# Patient Record
Sex: FEMALE | Race: ASIAN | NOT HISPANIC OR LATINO
[De-identification: names, ages, dates, MRNs, and addresses within clinical notes are randomized per-mention and may not be internally consistent; named-entity substitution may affect disease eponyms.]

---

## 2024-07-16 ENCOUNTER — APPOINTMENT (OUTPATIENT)
Dept: INTERNAL MEDICINE | Facility: CLINIC | Age: 34
End: 2024-07-16

## 2024-07-16 VITALS
DIASTOLIC BLOOD PRESSURE: 85 MMHG | SYSTOLIC BLOOD PRESSURE: 131 MMHG | OXYGEN SATURATION: 99 % | HEIGHT: 62 IN | HEART RATE: 70 BPM | BODY MASS INDEX: 24.18 KG/M2 | TEMPERATURE: 97.8 F | WEIGHT: 131.38 LBS

## 2024-07-16 DIAGNOSIS — Z33.1 PREGNANT STATE, INCIDENTAL: ICD-10-CM

## 2024-07-16 DIAGNOSIS — Z00.00 ENCOUNTER FOR GENERAL ADULT MEDICAL EXAMINATION W/OUT ABNORMAL FINDINGS: ICD-10-CM

## 2024-07-16 DIAGNOSIS — R42 DIZZINESS AND GIDDINESS: ICD-10-CM

## 2024-07-16 LAB
BILIRUB UR QL STRIP: NEGATIVE
CLARITY UR: CLEAR
COLLECTION METHOD: NORMAL
GLUCOSE UR-MCNC: NEGATIVE
HCG UR QL: 0.2 EU/DL
HGB UR QL STRIP.AUTO: NEGATIVE
KETONES UR-MCNC: NEGATIVE
LEUKOCYTE ESTERASE UR QL STRIP: NEGATIVE
NITRITE UR QL STRIP: NEGATIVE
PH UR STRIP: 7
PROT UR STRIP-MCNC: NEGATIVE
SP GR UR STRIP: 1.01

## 2024-07-16 PROCEDURE — 81003 URINALYSIS AUTO W/O SCOPE: CPT | Mod: QW

## 2024-07-16 PROCEDURE — 99385 PREV VISIT NEW AGE 18-39: CPT | Mod: 25,GC

## 2024-07-18 ENCOUNTER — NON-APPOINTMENT (OUTPATIENT)
Age: 34
End: 2024-07-18

## 2024-07-18 LAB
APPEARANCE: CLEAR
BACTERIA UR CULT: NORMAL
BASOPHILS # BLD AUTO: 0.04 K/UL
BASOPHILS NFR BLD AUTO: 0.5 %
BILIRUBIN URINE: NEGATIVE
BLOOD URINE: NEGATIVE
COLOR: YELLOW
CREAT SPEC-SCNC: 43 MG/DL
EOSINOPHIL # BLD AUTO: 0.09 K/UL
EOSINOPHIL NFR BLD AUTO: 1.1 %
GLUCOSE QUALITATIVE U: NEGATIVE MG/DL
HCG SERPL-MCNC: 7153 MIU/ML
HCT VFR BLD CALC: 41 %
HGB BLD-MCNC: 13.1 G/DL
IMM GRANULOCYTES NFR BLD AUTO: 0.2 %
KETONES URINE: NEGATIVE MG/DL
LEUKOCYTE ESTERASE URINE: NEGATIVE
LYMPHOCYTES # BLD AUTO: 2.4 K/UL
LYMPHOCYTES NFR BLD AUTO: 28.5 %
MAN DIFF?: NORMAL
MCHC RBC-ENTMCNC: 28.7 PG
MCHC RBC-ENTMCNC: 32 GM/DL
MCV RBC AUTO: 89.9 FL
MICROALBUMIN 24H UR DL<=1MG/L-MCNC: <1.2 MG/DL
MICROALBUMIN/CREAT 24H UR-RTO: NORMAL MG/G
MONOCYTES # BLD AUTO: 0.5 K/UL
MONOCYTES NFR BLD AUTO: 5.9 %
NEUTROPHILS # BLD AUTO: 5.38 K/UL
NEUTROPHILS NFR BLD AUTO: 63.8 %
NITRITE URINE: NEGATIVE
PH URINE: 6.5
PLATELET # BLD AUTO: 286 K/UL
PROTEIN URINE: NEGATIVE MG/DL
RBC # BLD: 4.56 M/UL
RBC # FLD: 13.7 %
SPECIFIC GRAVITY URINE: 1.01
UROBILINOGEN URINE: 0.2 MG/DL
WBC # FLD AUTO: 8.43 K/UL

## 2024-07-25 ENCOUNTER — APPOINTMENT (OUTPATIENT)
Dept: INTERNAL MEDICINE | Facility: CLINIC | Age: 34
End: 2024-07-25

## 2024-08-12 ENCOUNTER — TRANSCRIPTION ENCOUNTER (OUTPATIENT)
Age: 34
End: 2024-08-12

## 2024-08-12 ENCOUNTER — NON-APPOINTMENT (OUTPATIENT)
Age: 34
End: 2024-08-12

## 2024-08-12 ENCOUNTER — APPOINTMENT (OUTPATIENT)
Dept: OBGYN | Facility: CLINIC | Age: 34
End: 2024-08-12
Payer: COMMERCIAL

## 2024-08-12 VITALS
BODY MASS INDEX: 23.78 KG/M2 | DIASTOLIC BLOOD PRESSURE: 70 MMHG | WEIGHT: 130 LBS | SYSTOLIC BLOOD PRESSURE: 120 MMHG | OXYGEN SATURATION: 98 % | HEART RATE: 87 BPM

## 2024-08-12 DIAGNOSIS — Z34.90 ENCOUNTER FOR SUPERVISION OF NORMAL PREGNANCY, UNSPECIFIED, UNSPECIFIED TRIMESTER: ICD-10-CM

## 2024-08-12 PROCEDURE — 99202 OFFICE O/P NEW SF 15 MIN: CPT | Mod: 25

## 2024-08-13 LAB
ABO + RH PNL BLD: NORMAL
ALBUMIN SERPL ELPH-MCNC: 4.4 G/DL
ALP BLD-CCNC: 45 U/L
ALT SERPL-CCNC: 8 U/L
ANION GAP SERPL CALC-SCNC: 11 MMOL/L
AST SERPL-CCNC: 15 U/L
BILIRUB SERPL-MCNC: 0.3 MG/DL
BLD GP AB SCN SERPL QL: NORMAL
BUN SERPL-MCNC: 9 MG/DL
C TRACH RRNA SPEC QL NAA+PROBE: NOT DETECTED
CALCIUM SERPL-MCNC: 9.4 MG/DL
CHLORIDE SERPL-SCNC: 106 MMOL/L
CO2 SERPL-SCNC: 20 MMOL/L
CREAT SERPL-MCNC: 0.46 MG/DL
EGFR: 130 ML/MIN/1.73M2
GLUCOSE SERPL-MCNC: 84 MG/DL
HBV SURFACE AG SER QL: NONREACTIVE
HCT VFR BLD CALC: 37.6 %
HCV AB SER QL: NONREACTIVE
HCV S/CO RATIO: 0.08 S/CO
HGB A MFR BLD: 97.3 %
HGB A2 MFR BLD: 2.7 %
HGB BLD-MCNC: 12.8 G/DL
HGB FRACT BLD-IMP: NORMAL
HIV1+2 AB SPEC QL IA.RAPID: NONREACTIVE
MCHC RBC-ENTMCNC: 30 PG
MCHC RBC-ENTMCNC: 34 GM/DL
MCV RBC AUTO: 88.1 FL
N GONORRHOEA RRNA SPEC QL NAA+PROBE: NOT DETECTED
PLATELET # BLD AUTO: 306 K/UL
POTASSIUM SERPL-SCNC: 4 MMOL/L
PROT SERPL-MCNC: 6.3 G/DL
RBC # BLD: 4.27 M/UL
RBC # FLD: 13.3 %
SODIUM SERPL-SCNC: 137 MMOL/L
SOURCE AMPLIFICATION: NORMAL
WBC # FLD AUTO: 8.95 K/UL

## 2024-08-15 PROBLEM — Z34.90 ENCOUNTER FOR PRENATAL CARE: Status: ACTIVE | Noted: 2024-08-12

## 2024-08-15 NOTE — PLAN
[FreeTextEntry1] : 35 yo  at 9w4d presenting for initial prenatal visit  - c/w PNV daily  - Trimester 1 labs sent. Pap performed. - CRL consistent w/ VIET by LMP. VIET 3/13/25.  - RTC 4 wk  - Pregnancy folder given, referral for NT and 20w anatomy given.    PGY1 Seen w/ Dr. Thomas attending

## 2024-08-15 NOTE — PHYSICAL EXAM
[Chaperone Present] : A chaperone was present in the examining room during all aspects of the physical examination [Labia Majora] : normal [Labia Minora] : normal [Normal] : normal [Uterine Adnexae] : normal [FreeTextEntry2] : NAD [TextEntry] : , CRL consistent with EGA 9w4d. 3 Cm fibroid appreciated on US.

## 2024-08-15 NOTE — PLAN
[FreeTextEntry1] : 33 yo  at 9w4d presenting for initial prenatal visit  - c/w PNV daily  - Trimester 1 labs sent. Pap performed. - CRL consistent w/ VIET by LMP. VIET 3/13/25.  - RTC 4 wk  - Pregnancy folder given, referral for NT and 20w anatomy given.    PGY1 Seen w/ Dr. Thomas attending

## 2024-08-15 NOTE — HISTORY OF PRESENT ILLNESS
[N] : Patient does not use contraception [Monogamous (Male Partner)] : is monogamous with a male partner [Y] : Positive pregnancy history [Normal Amount/Duration] :  normal amount and duration [Frequency: Q ___ days] : menstrual periods occur approximately every [unfilled] days [Currently Active] : currently active [Men] : men [No] : No [LMPDate] : 6/6/24 [MensesFreq] : 31-55 [MensesLength] : 6 [PGxTotal] : 1 [PGHxFullTerm] : 0 [PGHxPremature] : 0 [PGHxAbortions] : 0 [[facility]] : 0 [PGHxABInduced] : 0 [PGHxABSpont] : 0 [PGHxEctopic] : 0 [PGHxMultBirths] : 0 [FreeTextEntry1] : 6/6/24

## 2024-08-15 NOTE — HISTORY OF PRESENT ILLNESS
[N] : Patient does not use contraception [Monogamous (Male Partner)] : is monogamous with a male partner [Y] : Positive pregnancy history [Normal Amount/Duration] :  normal amount and duration [Frequency: Q ___ days] : menstrual periods occur approximately every [unfilled] days [Currently Active] : currently active [Men] : men [No] : No [LMPDate] : 6/6/24 [MensesFreq] : 31-27 [MensesLength] : 6 [PGxTotal] : 1 [PGHxFullTerm] : 0 [PGHxPremature] : 0 [PGHxAbortions] : 0 [[facility]] : 0 [PGHxABInduced] : 0 [PGHxABSpont] : 0 [PGHxEctopic] : 0 [PGHxMultBirths] : 0 [FreeTextEntry1] : 6/6/24

## 2024-08-15 NOTE — HISTORY OF PRESENT ILLNESS
[N] : Patient does not use contraception [Monogamous (Male Partner)] : is monogamous with a male partner [Y] : Positive pregnancy history [Normal Amount/Duration] :  normal amount and duration [Frequency: Q ___ days] : menstrual periods occur approximately every [unfilled] days [Currently Active] : currently active [Men] : men [No] : No [LMPDate] : 6/6/24 [MensesFreq] : 31-85 [MensesLength] : 6 [PGxTotal] : 1 [PGHxFullTerm] : 0 [PGHxPremature] : 0 [PGHxAbortions] : 0 [[facility]] : 0 [PGHxABInduced] : 0 [PGHxABSpont] : 0 [PGHxEctopic] : 0 [PGHxMultBirths] : 0 [FreeTextEntry1] : 6/6/24

## 2024-08-17 ENCOUNTER — NON-APPOINTMENT (OUTPATIENT)
Age: 34
End: 2024-08-17

## 2024-08-18 LAB
AR GENE MUT ANL BLD/T: NORMAL
B19V IGG SER QL IA: 4.18 INDEX
B19V IGG+IGM SER-IMP: NORMAL
B19V IGG+IGM SER-IMP: POSITIVE
B19V IGM FLD-ACNC: 0.28 INDEX
B19V IGM SER-ACNC: NEGATIVE
BACTERIA UR CULT: NORMAL
CMV IGG SERPL QL: 1.9 U/ML
CMV IGG SERPL-IMP: POSITIVE
CMV IGM SERPL QL: <8 AU/ML
CMV IGM SERPL QL: NEGATIVE
CYTOLOGY CVX/VAG DOC THIN PREP: NORMAL
FMR1 GENE MUT ANL BLD/T: NORMAL
RUBV IGG FLD-ACNC: 4.08 INDEX
RUBV IGG SER-IMP: POSITIVE
T GONDII AB SER-IMP: NEGATIVE
T GONDII AB SER-IMP: NEGATIVE
T GONDII IGG SER QL: <3 IU/ML
T GONDII IGM SER QL: <3 AU/ML
T PALLIDUM AB SER QL IA: NEGATIVE

## 2024-08-19 LAB — CFTR MUT TESTED BLD/T: NEGATIVE

## 2024-08-20 ENCOUNTER — NON-APPOINTMENT (OUTPATIENT)
Age: 34
End: 2024-08-20

## 2024-08-29 ENCOUNTER — APPOINTMENT (OUTPATIENT)
Dept: ANTEPARTUM | Facility: CLINIC | Age: 34
End: 2024-08-29
Payer: COMMERCIAL

## 2024-08-29 ENCOUNTER — ASOB RESULT (OUTPATIENT)
Age: 34
End: 2024-08-29

## 2024-08-29 PROCEDURE — 76813 OB US NUCHAL MEAS 1 GEST: CPT

## 2024-08-29 PROCEDURE — 93976 VASCULAR STUDY: CPT

## 2024-09-04 ENCOUNTER — NON-APPOINTMENT (OUTPATIENT)
Age: 34
End: 2024-09-04

## 2024-09-10 ENCOUNTER — APPOINTMENT (OUTPATIENT)
Dept: OBGYN | Facility: CLINIC | Age: 34
End: 2024-09-10
Payer: COMMERCIAL

## 2024-09-10 VITALS
BODY MASS INDEX: 23.96 KG/M2 | OXYGEN SATURATION: 97 % | DIASTOLIC BLOOD PRESSURE: 76 MMHG | SYSTOLIC BLOOD PRESSURE: 118 MMHG | WEIGHT: 131 LBS | HEART RATE: 79 BPM

## 2024-09-10 PROCEDURE — 99203 OFFICE O/P NEW LOW 30 MIN: CPT | Mod: 25

## 2024-10-07 ENCOUNTER — APPOINTMENT (OUTPATIENT)
Dept: OBGYN | Facility: CLINIC | Age: 34
End: 2024-10-07

## 2024-10-07 VITALS
DIASTOLIC BLOOD PRESSURE: 71 MMHG | SYSTOLIC BLOOD PRESSURE: 113 MMHG | OXYGEN SATURATION: 98 % | BODY MASS INDEX: 24.33 KG/M2 | WEIGHT: 133 LBS | HEART RATE: 70 BPM

## 2024-10-07 DIAGNOSIS — Z23 ENCOUNTER FOR IMMUNIZATION: ICD-10-CM

## 2024-10-07 PROCEDURE — 99213 OFFICE O/P EST LOW 20 MIN: CPT | Mod: 25

## 2024-10-07 PROCEDURE — G0008: CPT

## 2024-10-07 PROCEDURE — 90656 IIV3 VACC NO PRSV 0.5 ML IM: CPT

## 2024-10-08 LAB
AFP INTERP SERPL-IMP: NORMAL
AFP INTERP SERPL-IMP: NORMAL
AFP MOM CUT-OFF: 2.5
AFP MOM SERPL: 1.01
AFP PERCENTILE: 51.3
AFP SERPL-ACNC: 46.23 NG/ML
CARBAMAZEPINE?: NO
CURRENT SMOKER: NORMAL
DIABETES STATUS PATIENT: NORMAL
GA: NORMAL
GESTATIONAL AGE METHOD: NORMAL
HX OF NTD NARR: NORMAL
MULTIPLE PREGNANCY: NORMAL
NEURAL TUBE DEFECT RISK FETUS: NORMAL
NEURAL TUBE DEFECT RISK POP: NORMAL
RECOM F/U: NO
TEST PERFORMANCE INFO SPEC: NORMAL
VALPROIC ACID?: NORMAL

## 2024-10-23 ENCOUNTER — APPOINTMENT (OUTPATIENT)
Dept: ANTEPARTUM | Facility: CLINIC | Age: 34
End: 2024-10-23

## 2024-10-23 ENCOUNTER — ASOB RESULT (OUTPATIENT)
Age: 34
End: 2024-10-23

## 2024-10-23 PROCEDURE — 76811 OB US DETAILED SNGL FETUS: CPT

## 2024-10-23 PROCEDURE — 76817 TRANSVAGINAL US OBSTETRIC: CPT

## 2024-10-30 ENCOUNTER — NON-APPOINTMENT (OUTPATIENT)
Age: 34
End: 2024-10-30

## 2024-11-04 ENCOUNTER — APPOINTMENT (OUTPATIENT)
Dept: OBGYN | Facility: CLINIC | Age: 34
End: 2024-11-04
Payer: COMMERCIAL

## 2024-11-04 VITALS
DIASTOLIC BLOOD PRESSURE: 80 MMHG | SYSTOLIC BLOOD PRESSURE: 110 MMHG | WEIGHT: 136 LBS | BODY MASS INDEX: 24.88 KG/M2 | OXYGEN SATURATION: 98 %

## 2024-11-04 PROCEDURE — 99213 OFFICE O/P EST LOW 20 MIN: CPT | Mod: 25

## 2024-11-05 ENCOUNTER — APPOINTMENT (OUTPATIENT)
Dept: ANTEPARTUM | Facility: CLINIC | Age: 34
End: 2024-11-05

## 2024-11-07 ENCOUNTER — ASOB RESULT (OUTPATIENT)
Age: 34
End: 2024-11-07

## 2024-11-07 ENCOUNTER — APPOINTMENT (OUTPATIENT)
Dept: ANTEPARTUM | Facility: CLINIC | Age: 34
End: 2024-11-07
Payer: COMMERCIAL

## 2024-11-07 PROCEDURE — 76816 OB US FOLLOW-UP PER FETUS: CPT

## 2024-11-27 ENCOUNTER — NON-APPOINTMENT (OUTPATIENT)
Age: 34
End: 2024-11-27

## 2024-12-02 ENCOUNTER — APPOINTMENT (OUTPATIENT)
Dept: OBGYN | Facility: CLINIC | Age: 34
End: 2024-12-02
Payer: COMMERCIAL

## 2024-12-02 VITALS
WEIGHT: 139 LBS | BODY MASS INDEX: 25.42 KG/M2 | HEART RATE: 86 BPM | SYSTOLIC BLOOD PRESSURE: 120 MMHG | DIASTOLIC BLOOD PRESSURE: 86 MMHG | OXYGEN SATURATION: 99 %

## 2024-12-02 DIAGNOSIS — N89.8 OTHER SPECIFIED NONINFLAMMATORY DISORDERS OF VAGINA: ICD-10-CM

## 2024-12-02 PROCEDURE — 99214 OFFICE O/P EST MOD 30 MIN: CPT

## 2024-12-03 LAB
BASOPHILS # BLD AUTO: 0.03 K/UL
BASOPHILS NFR BLD AUTO: 0.4 %
BV BACTERIA RRNA VAG QL NAA+PROBE: NOT DETECTED
C GLABRATA RNA VAG QL NAA+PROBE: NOT DETECTED
CANDIDA RRNA VAG QL PROBE: NOT DETECTED
EOSINOPHIL # BLD AUTO: 0.09 K/UL
EOSINOPHIL NFR BLD AUTO: 1.1 %
GLUCOSE 1H P 50 G GLC PO SERPL-MCNC: 81 MG/DL
HCT VFR BLD CALC: 40.6 %
HGB BLD-MCNC: 13.1 G/DL
IMM GRANULOCYTES NFR BLD AUTO: 0.4 %
LYMPHOCYTES # BLD AUTO: 1.79 K/UL
LYMPHOCYTES NFR BLD AUTO: 22.2 %
MAN DIFF?: NORMAL
MCHC RBC-ENTMCNC: 30.3 PG
MCHC RBC-ENTMCNC: 32.3 G/DL
MCV RBC AUTO: 93.8 FL
MONOCYTES # BLD AUTO: 0.42 K/UL
MONOCYTES NFR BLD AUTO: 5.2 %
NEUTROPHILS # BLD AUTO: 5.71 K/UL
NEUTROPHILS NFR BLD AUTO: 70.7 %
PLATELET # BLD AUTO: 262 K/UL
RBC # BLD: 4.33 M/UL
RBC # FLD: 14.1 %
T PALLIDUM AB SER QL IA: NEGATIVE
T VAGINALIS RRNA SPEC QL NAA+PROBE: NOT DETECTED
WBC # FLD AUTO: 8.07 K/UL

## 2024-12-04 LAB — BACTERIA UR CULT: NORMAL

## 2024-12-18 ENCOUNTER — NON-APPOINTMENT (OUTPATIENT)
Age: 34
End: 2024-12-18

## 2024-12-31 ENCOUNTER — APPOINTMENT (OUTPATIENT)
Dept: OBGYN | Facility: CLINIC | Age: 34
End: 2024-12-31

## 2024-12-31 VITALS
DIASTOLIC BLOOD PRESSURE: 60 MMHG | WEIGHT: 142.42 LBS | OXYGEN SATURATION: 97 % | SYSTOLIC BLOOD PRESSURE: 110 MMHG | BODY MASS INDEX: 26.05 KG/M2

## 2024-12-31 PROCEDURE — 90715 TDAP VACCINE 7 YRS/> IM: CPT

## 2024-12-31 PROCEDURE — 99212 OFFICE O/P EST SF 10 MIN: CPT | Mod: 25

## 2024-12-31 PROCEDURE — 90471 IMMUNIZATION ADMIN: CPT

## 2025-01-14 ENCOUNTER — APPOINTMENT (OUTPATIENT)
Dept: OBGYN | Facility: CLINIC | Age: 35
End: 2025-01-14
Payer: COMMERCIAL

## 2025-01-14 ENCOUNTER — NON-APPOINTMENT (OUTPATIENT)
Age: 35
End: 2025-01-14

## 2025-01-14 VITALS
DIASTOLIC BLOOD PRESSURE: 70 MMHG | OXYGEN SATURATION: 98 % | SYSTOLIC BLOOD PRESSURE: 100 MMHG | BODY MASS INDEX: 25.97 KG/M2 | WEIGHT: 142 LBS

## 2025-01-14 PROCEDURE — 99212 OFFICE O/P EST SF 10 MIN: CPT

## 2025-01-16 ENCOUNTER — APPOINTMENT (OUTPATIENT)
Dept: ANTEPARTUM | Facility: CLINIC | Age: 35
End: 2025-01-16
Payer: COMMERCIAL

## 2025-01-16 ENCOUNTER — ASOB RESULT (OUTPATIENT)
Age: 35
End: 2025-01-16

## 2025-01-16 PROCEDURE — 76819 FETAL BIOPHYS PROFIL W/O NST: CPT | Mod: 59

## 2025-01-16 PROCEDURE — 76816 OB US FOLLOW-UP PER FETUS: CPT

## 2025-01-16 PROCEDURE — 76820 UMBILICAL ARTERY ECHO: CPT | Mod: 59

## 2025-01-28 ENCOUNTER — APPOINTMENT (OUTPATIENT)
Dept: OBGYN | Facility: CLINIC | Age: 35
End: 2025-01-28
Payer: COMMERCIAL

## 2025-01-28 VITALS
DIASTOLIC BLOOD PRESSURE: 67 MMHG | SYSTOLIC BLOOD PRESSURE: 116 MMHG | WEIGHT: 145 LBS | HEART RATE: 80 BPM | OXYGEN SATURATION: 98 % | BODY MASS INDEX: 26.52 KG/M2

## 2025-01-28 PROCEDURE — 99214 OFFICE O/P EST MOD 30 MIN: CPT

## 2025-02-11 ENCOUNTER — APPOINTMENT (OUTPATIENT)
Dept: OBGYN | Facility: CLINIC | Age: 35
End: 2025-02-11

## 2025-02-11 VITALS
DIASTOLIC BLOOD PRESSURE: 70 MMHG | WEIGHT: 147 LBS | BODY MASS INDEX: 26.89 KG/M2 | OXYGEN SATURATION: 98 % | SYSTOLIC BLOOD PRESSURE: 110 MMHG

## 2025-02-11 PROCEDURE — 99213 OFFICE O/P EST LOW 20 MIN: CPT | Mod: 25

## 2025-02-20 ENCOUNTER — APPOINTMENT (OUTPATIENT)
Dept: ANTEPARTUM | Facility: CLINIC | Age: 35
End: 2025-02-20
Payer: COMMERCIAL

## 2025-02-20 ENCOUNTER — APPOINTMENT (OUTPATIENT)
Dept: OBGYN | Facility: CLINIC | Age: 35
End: 2025-02-20

## 2025-02-20 ENCOUNTER — ASOB RESULT (OUTPATIENT)
Age: 35
End: 2025-02-20

## 2025-02-20 PROCEDURE — 76820 UMBILICAL ARTERY ECHO: CPT | Mod: 59

## 2025-02-20 PROCEDURE — 76816 OB US FOLLOW-UP PER FETUS: CPT

## 2025-02-20 PROCEDURE — 99213 OFFICE O/P EST LOW 20 MIN: CPT | Mod: 25

## 2025-02-20 PROCEDURE — 76819 FETAL BIOPHYS PROFIL W/O NST: CPT

## 2025-02-23 LAB
HBV SURFACE AG SER QL: NONREACTIVE
HCT VFR BLD CALC: 40.9 %
HCV RNA SERPL NAA+PROBE-LOG IU: NOT DETECTED LOGIU/ML
HEPC RNA INTERP: NOT DETECTED
HGB BLD-MCNC: 14.1 G/DL
HIV1+2 AB SPEC QL IA.RAPID: NONREACTIVE
MCHC RBC-ENTMCNC: 30.4 PG
MCHC RBC-ENTMCNC: 34.5 G/DL
MCV RBC AUTO: 88.1 FL
PLATELET # BLD AUTO: 222 K/UL
RBC # BLD: 4.64 M/UL
RBC # FLD: 13.5 %
T PALLIDUM AB SER QL IA: NEGATIVE
WBC # FLD AUTO: 9.92 K/UL

## 2025-02-24 ENCOUNTER — NON-APPOINTMENT (OUTPATIENT)
Age: 35
End: 2025-02-24

## 2025-02-24 ENCOUNTER — APPOINTMENT (OUTPATIENT)
Dept: OBGYN | Facility: CLINIC | Age: 35
End: 2025-02-24
Payer: COMMERCIAL

## 2025-02-24 VITALS
OXYGEN SATURATION: 98 % | DIASTOLIC BLOOD PRESSURE: 78 MMHG | WEIGHT: 149 LBS | SYSTOLIC BLOOD PRESSURE: 126 MMHG | HEART RATE: 73 BPM | BODY MASS INDEX: 27.25 KG/M2

## 2025-02-24 LAB — B-HEM STREP SPEC QL CULT: NORMAL

## 2025-02-24 PROCEDURE — 99213 OFFICE O/P EST LOW 20 MIN: CPT

## 2025-03-06 ENCOUNTER — APPOINTMENT (OUTPATIENT)
Dept: OBGYN | Facility: CLINIC | Age: 35
End: 2025-03-06
Payer: COMMERCIAL

## 2025-03-06 VITALS
OXYGEN SATURATION: 98 % | WEIGHT: 151 LBS | DIASTOLIC BLOOD PRESSURE: 80 MMHG | BODY MASS INDEX: 27.62 KG/M2 | SYSTOLIC BLOOD PRESSURE: 120 MMHG

## 2025-03-06 PROCEDURE — 99213 OFFICE O/P EST LOW 20 MIN: CPT

## 2025-03-10 ENCOUNTER — NON-APPOINTMENT (OUTPATIENT)
Age: 35
End: 2025-03-10

## 2025-03-10 ENCOUNTER — APPOINTMENT (OUTPATIENT)
Dept: OBGYN | Facility: CLINIC | Age: 35
End: 2025-03-10
Payer: COMMERCIAL

## 2025-03-10 VITALS
SYSTOLIC BLOOD PRESSURE: 120 MMHG | OXYGEN SATURATION: 97 % | BODY MASS INDEX: 27.98 KG/M2 | DIASTOLIC BLOOD PRESSURE: 80 MMHG | WEIGHT: 153 LBS

## 2025-03-10 PROCEDURE — 99214 OFFICE O/P EST MOD 30 MIN: CPT

## 2025-03-17 ENCOUNTER — ASOB RESULT (OUTPATIENT)
Age: 35
End: 2025-03-17

## 2025-03-17 ENCOUNTER — APPOINTMENT (OUTPATIENT)
Dept: ANTEPARTUM | Facility: CLINIC | Age: 35
End: 2025-03-17
Payer: COMMERCIAL

## 2025-03-17 ENCOUNTER — APPOINTMENT (OUTPATIENT)
Dept: OBGYN | Facility: CLINIC | Age: 35
End: 2025-03-17
Payer: COMMERCIAL

## 2025-03-17 VITALS
DIASTOLIC BLOOD PRESSURE: 80 MMHG | SYSTOLIC BLOOD PRESSURE: 110 MMHG | BODY MASS INDEX: 27.98 KG/M2 | OXYGEN SATURATION: 97 % | WEIGHT: 153 LBS

## 2025-03-17 PROCEDURE — 76816 OB US FOLLOW-UP PER FETUS: CPT

## 2025-03-17 PROCEDURE — 76818 FETAL BIOPHYS PROFILE W/NST: CPT | Mod: 59

## 2025-03-17 PROCEDURE — 76820 UMBILICAL ARTERY ECHO: CPT | Mod: 59

## 2025-03-17 PROCEDURE — 99213 OFFICE O/P EST LOW 20 MIN: CPT

## 2025-03-20 ENCOUNTER — INPATIENT (INPATIENT)
Facility: HOSPITAL | Age: 35
LOS: 3 days | Discharge: HOME | End: 2025-03-24
Attending: OBSTETRICS & GYNECOLOGY | Admitting: OBSTETRICS & GYNECOLOGY
Payer: COMMERCIAL

## 2025-03-21 VITALS
SYSTOLIC BLOOD PRESSURE: 105 MMHG | TEMPERATURE: 98 F | DIASTOLIC BLOOD PRESSURE: 66 MMHG | WEIGHT: 293 LBS | RESPIRATION RATE: 18 BRPM | HEIGHT: 62 IN | HEART RATE: 65 BPM

## 2025-03-21 LAB
BASOPHILS # BLD AUTO: 0.04 K/UL — SIGNIFICANT CHANGE UP (ref 0–0.2)
BASOPHILS NFR BLD AUTO: 0.4 % — SIGNIFICANT CHANGE UP (ref 0–2)
BLD GP AB SCN SERPL QL: NEGATIVE — SIGNIFICANT CHANGE UP
EOSINOPHIL # BLD AUTO: 0.05 K/UL — SIGNIFICANT CHANGE UP (ref 0–0.5)
EOSINOPHIL NFR BLD AUTO: 0.4 % — SIGNIFICANT CHANGE UP (ref 0–6)
HCT VFR BLD CALC: 38.5 % — SIGNIFICANT CHANGE UP (ref 34.5–45)
HGB BLD-MCNC: 13.3 G/DL — SIGNIFICANT CHANGE UP (ref 11.5–15.5)
IMM GRANULOCYTES NFR BLD AUTO: 0.7 % — SIGNIFICANT CHANGE UP (ref 0–0.9)
LYMPHOCYTES # BLD AUTO: 2.95 K/UL — SIGNIFICANT CHANGE UP (ref 1–3.3)
LYMPHOCYTES # BLD AUTO: 26.1 % — SIGNIFICANT CHANGE UP (ref 13–44)
MCHC RBC-ENTMCNC: 30.8 PG — SIGNIFICANT CHANGE UP (ref 27–34)
MCHC RBC-ENTMCNC: 34.5 G/DL — SIGNIFICANT CHANGE UP (ref 32–36)
MCV RBC AUTO: 89.1 FL — SIGNIFICANT CHANGE UP (ref 80–100)
MONOCYTES # BLD AUTO: 0.66 K/UL — SIGNIFICANT CHANGE UP (ref 0–0.9)
MONOCYTES NFR BLD AUTO: 5.8 % — SIGNIFICANT CHANGE UP (ref 2–14)
NEUTROPHILS # BLD AUTO: 7.51 K/UL — HIGH (ref 1.8–7.4)
NEUTROPHILS NFR BLD AUTO: 66.6 % — SIGNIFICANT CHANGE UP (ref 43–77)
NRBC BLD AUTO-RTO: 0 /100 WBCS — SIGNIFICANT CHANGE UP (ref 0–0)
PLATELET # BLD AUTO: 252 K/UL — SIGNIFICANT CHANGE UP (ref 150–400)
RBC # BLD: 4.32 M/UL — SIGNIFICANT CHANGE UP (ref 3.8–5.2)
RBC # FLD: 12.7 % — SIGNIFICANT CHANGE UP (ref 10.3–14.5)
RH IG SCN BLD-IMP: POSITIVE — SIGNIFICANT CHANGE UP
RH IG SCN BLD-IMP: POSITIVE — SIGNIFICANT CHANGE UP
T PALLIDUM AB TITR SER: NEGATIVE — SIGNIFICANT CHANGE UP
WBC # BLD: 11.29 K/UL — HIGH (ref 3.8–10.5)
WBC # FLD AUTO: 11.29 K/UL — HIGH (ref 3.8–10.5)

## 2025-03-21 PROCEDURE — 88307 TISSUE EXAM BY PATHOLOGIST: CPT | Mod: 26

## 2025-03-21 PROCEDURE — 74018 RADEX ABDOMEN 1 VIEW: CPT | Mod: 26

## 2025-03-21 PROCEDURE — 59514 CESAREAN DELIVERY ONLY: CPT | Mod: U9,GC

## 2025-03-21 RX ORDER — MAGNESIUM HYDROXIDE 400 MG/5ML
30 SUSPENSION ORAL
Refills: 0 | Status: DISCONTINUED | OUTPATIENT
Start: 2025-03-21 | End: 2025-03-24

## 2025-03-21 RX ORDER — ONDANSETRON HCL/PF 4 MG/2 ML
4 VIAL (ML) INJECTION EVERY 6 HOURS
Refills: 0 | Status: DISCONTINUED | OUTPATIENT
Start: 2025-03-21 | End: 2025-03-24

## 2025-03-21 RX ORDER — CEFAZOLIN SODIUM IN 0.9 % NACL 3 G/100 ML
2000 INTRAVENOUS SOLUTION, PIGGYBACK (ML) INTRAVENOUS EVERY 8 HOURS
Refills: 0 | Status: COMPLETED | OUTPATIENT
Start: 2025-03-21 | End: 2025-03-22

## 2025-03-21 RX ORDER — OXYCODONE HYDROCHLORIDE 30 MG/1
5 TABLET ORAL
Refills: 0 | Status: DISCONTINUED | OUTPATIENT
Start: 2025-03-21 | End: 2025-03-24

## 2025-03-21 RX ORDER — SIMETHICONE 80 MG
80 TABLET,CHEWABLE ORAL EVERY 4 HOURS
Refills: 0 | Status: DISCONTINUED | OUTPATIENT
Start: 2025-03-21 | End: 2025-03-24

## 2025-03-21 RX ORDER — ACETAMINOPHEN 500 MG/5ML
975 LIQUID (ML) ORAL
Refills: 0 | Status: DISCONTINUED | OUTPATIENT
Start: 2025-03-21 | End: 2025-03-24

## 2025-03-21 RX ORDER — OXYCODONE HYDROCHLORIDE 30 MG/1
5 TABLET ORAL ONCE
Refills: 0 | Status: DISCONTINUED | OUTPATIENT
Start: 2025-03-21 | End: 2025-03-24

## 2025-03-21 RX ORDER — DIPHENHYDRAMINE HCL 12.5MG/5ML
25 ELIXIR ORAL EVERY 6 HOURS
Refills: 0 | Status: DISCONTINUED | OUTPATIENT
Start: 2025-03-21 | End: 2025-03-24

## 2025-03-21 RX ORDER — HYDROMORPHONE/SOD CHLOR,ISO/PF 2 MG/10 ML
30 SYRINGE (ML) INJECTION
Refills: 0 | Status: DISCONTINUED | OUTPATIENT
Start: 2025-03-21 | End: 2025-03-22

## 2025-03-21 RX ORDER — ACETAMINOPHEN 500 MG/5ML
1000 LIQUID (ML) ORAL ONCE
Refills: 0 | Status: COMPLETED | OUTPATIENT
Start: 2025-03-21 | End: 2025-03-21

## 2025-03-21 RX ORDER — OXYTOCIN-SODIUM CHLORIDE 0.9% IV SOLN 30 UNIT/500ML 30-0.9/5 UT/ML-%
167 SOLUTION INTRAVENOUS
Qty: 30 | Refills: 0 | Status: DISCONTINUED | OUTPATIENT
Start: 2025-03-21 | End: 2025-03-24

## 2025-03-21 RX ORDER — SODIUM CHLORIDE 9 G/1000ML
1000 INJECTION, SOLUTION INTRAVENOUS
Refills: 0 | Status: DISCONTINUED | OUTPATIENT
Start: 2025-03-21 | End: 2025-03-24

## 2025-03-21 RX ORDER — OXYTOCIN-SODIUM CHLORIDE 0.9% IV SOLN 30 UNIT/500ML 30-0.9/5 UT/ML-%
SOLUTION INTRAVENOUS
Qty: 30 | Refills: 0 | Status: DISCONTINUED | OUTPATIENT
Start: 2025-03-21 | End: 2025-03-21

## 2025-03-21 RX ORDER — IBUPROFEN 200 MG
600 TABLET ORAL EVERY 6 HOURS
Refills: 0 | Status: COMPLETED | OUTPATIENT
Start: 2025-03-21 | End: 2026-02-17

## 2025-03-21 RX ORDER — CITRIC ACID/SODIUM CITRATE 300-500 MG
15 SOLUTION, ORAL ORAL EVERY 6 HOURS
Refills: 0 | Status: DISCONTINUED | OUTPATIENT
Start: 2025-03-21 | End: 2025-03-21

## 2025-03-21 RX ORDER — OXYTOCIN-SODIUM CHLORIDE 0.9% IV SOLN 30 UNIT/500ML 30-0.9/5 UT/ML-%
42 SOLUTION INTRAVENOUS
Qty: 30 | Refills: 0 | Status: DISCONTINUED | OUTPATIENT
Start: 2025-03-21 | End: 2025-03-24

## 2025-03-21 RX ORDER — SODIUM CHLORIDE 9 G/1000ML
1000 INJECTION, SOLUTION INTRAVENOUS
Refills: 0 | Status: DISCONTINUED | OUTPATIENT
Start: 2025-03-21 | End: 2025-03-21

## 2025-03-21 RX ORDER — ENOXAPARIN SODIUM 100 MG/ML
40 INJECTION SUBCUTANEOUS EVERY 24 HOURS
Refills: 0 | Status: DISCONTINUED | OUTPATIENT
Start: 2025-03-22 | End: 2025-03-24

## 2025-03-21 RX ORDER — CLOSTRIDIUM TETANI TOXOID ANTIGEN (FORMALDEHYDE INACTIVATED), CORYNEBACTERIUM DIPHTHERIAE TOXOID ANTIGEN (FORMALDEHYDE INACTIVATED), BORDETELLA PERTUSSIS TOXOID ANTIGEN (GLUTARALDEHYDE INACTIVATED), BORDETELLA PERTUSSIS FILAMENTOUS HEMAGGLUTININ ANTIGEN (FORMALDEHYDE INACTIVATED), BORDETELLA PERTUSSIS PERTACTIN ANTIGEN, AND BORDETELLA PERTUSSIS FIMBRIAE 2/3 ANTIGEN 5; 2; 2.5; 5; 3; 5 [LF]/.5ML; [LF]/.5ML; UG/.5ML; UG/.5ML; UG/.5ML; UG/.5ML
0.5 INJECTION, SUSPENSION INTRAMUSCULAR ONCE
Refills: 0 | Status: DISCONTINUED | OUTPATIENT
Start: 2025-03-21 | End: 2025-03-24

## 2025-03-21 RX ORDER — MODIFIED LANOLIN 100 %
1 CREAM (GRAM) TOPICAL EVERY 6 HOURS
Refills: 0 | Status: DISCONTINUED | OUTPATIENT
Start: 2025-03-21 | End: 2025-03-24

## 2025-03-21 RX ORDER — KETOROLAC TROMETHAMINE 30 MG/ML
30 INJECTION, SOLUTION INTRAMUSCULAR; INTRAVENOUS EVERY 6 HOURS
Refills: 0 | Status: DISCONTINUED | OUTPATIENT
Start: 2025-03-21 | End: 2025-03-22

## 2025-03-21 RX ORDER — NALOXONE HYDROCHLORIDE 0.4 MG/ML
0.1 INJECTION, SOLUTION INTRAMUSCULAR; INTRAVENOUS; SUBCUTANEOUS
Refills: 0 | Status: DISCONTINUED | OUTPATIENT
Start: 2025-03-21 | End: 2025-03-24

## 2025-03-21 RX ADMIN — KETOROLAC TROMETHAMINE 30 MILLIGRAM(S): 30 INJECTION, SOLUTION INTRAMUSCULAR; INTRAVENOUS at 18:38

## 2025-03-21 RX ADMIN — Medication 30 MILLILITER(S): at 13:00

## 2025-03-21 RX ADMIN — SODIUM CHLORIDE 125 MILLILITER(S): 9 INJECTION, SOLUTION INTRAVENOUS at 12:38

## 2025-03-21 RX ADMIN — SODIUM CHLORIDE 125 MILLILITER(S): 9 INJECTION, SOLUTION INTRAVENOUS at 08:15

## 2025-03-21 RX ADMIN — Medication 100 MILLIGRAM(S): at 19:15

## 2025-03-21 RX ADMIN — KETOROLAC TROMETHAMINE 30 MILLIGRAM(S): 30 INJECTION, SOLUTION INTRAMUSCULAR; INTRAVENOUS at 23:52

## 2025-03-21 RX ADMIN — OXYTOCIN-SODIUM CHLORIDE 0.9% IV SOLN 30 UNIT/500ML 42 MILLIUNIT(S)/MIN: 30-0.9/5 SOLUTION at 12:38

## 2025-03-21 NOTE — OB RN INTRAOPERATIVE NOTE - NSSELHIDDEN_OBGYN_ALL_OB_FT
[NS_DeliveryAttending1_OBGYN_ALL_OB_FT:[id number]==],[NS_DeliveryAssist1_OBGYN_ALL_OB_FT:[id number]=],[NS_DeliveryRN_OBGYN_ALL_OB_FT:[id number]=]

## 2025-03-21 NOTE — OB PROVIDER H&P - HISTORY OF PRESENT ILLNESS
33 y/o  at 41w1d presenting for induction of labor for post-dates. She denies loss of fluid, contractions, and vaginal bleeding. She endorses normal fetal movement.    Ante: Spontaneous pregnancy. NIPT and anatomy scan wnl. Passed GCT. Denies elevated blood pressures in this pregnancy.  EFW 3000 g by [place] 3/17: Cephalic. Posterior placenta. ARMAAN 15.67. EFW 3539 35%, AC 21%.  GBS negative    OBHX:  G1 - Current  GynHX: denies fibroids, ovarian cysts, abnormal pap smear, STI/herpes.   MedHX: denies  Surghx: denies  Medications: denies  Allergies: NKDA    Physical Exam:  T(C): 36.8 (25 @ 01:14), Max: 36.8 (25 @ 01:14)  HR: 65 (25 @ 01:14) (65 - 65)  BP: 105/66 (25 @ 01:14) (105/66 - 105/66)  RR: 18 (25 @ 01:14) (18 - 18)  SpO2: --    General: in no acute distress  Pulm: no increased WOB  Abdomen: soft, gravid, nontender  Extremities: wnl     TAUS: Cephalic  VE: 1-2/50/-3    EFM: 115 bpm, mod variability, + accels, - decels; reactive and reassuring  [place]: no ctx seen on toco    Assessment and Plan  33 y/o  at 41w1d presenting for induction of labor for post-dates.  - Admit to L&D  - Consent signed for balloon, pitocin, and vaginal delivery; all risks/benefits/alternatives were discussed and patient agreed to above plan  - NPO  - IV fluids and labs ordered  - GBS negative  - Continuous EFM     Discussed with Dr. Caldreon and attending Dr. Alexx Richardson PGY1

## 2025-03-21 NOTE — OB PROVIDER LABOR PROGRESS NOTE - NS_SUBJECTIVE/OBJECTIVE_OBGYN_ALL_OB_FT
Balloon placed at 0400 80 to tension; VE 1-2/50/-3.  EFM reviewed.  Baseline rate is 125 bpm, mod ankit, + accels, - decels  No ctx seen on toco  Cat I tracing  Plan to start pitocin shortly
Day team assuming care at this time. EFM and labor plan reviewed. Last VE: 2/50/-3.

## 2025-03-21 NOTE — PRE-ANESTHESIA EVALUATION ADULT - WEIGHT IN LBS
SELINA called the [facility] to follow up on patient's referral.  SELINA was informed that it was assigned to a worker, Sugar ([phone]), and that she made contact with patient on 8/24.    SELINA attempted to contact Sugar and left a message requesting a return call.   125

## 2025-03-21 NOTE — OB PROVIDER DELIVERY SUMMARY - NSCSPRIMARYINDICATIONA_OBGYN_ALL_OB
3/20/2017 2:50 PM 
 
Patient:  Contreras Cast YOB: 1948 Date of Visit: 3/20/2017 Dear Sivan Guy, NP 
[address] [place] [id number] [place] [id number] VIA In Basket 
 : Thank you for referring Ms. Papi James to me for evaluation/treatment. Below are the relevant portions of my assessment and plan of care. If you have questions, please do not hesitate to call me. I look forward to following Ms. Felix Maldonado along with you. Sincerely, Jay Archibald MD 
 

Fetal Heart Rate Abnormality

## 2025-03-21 NOTE — OB PROVIDER DELIVERY SUMMARY - NSSELHIDDEN_OBGYN_ALL_OB_FT
[NS_DeliveryAttending1_OBGYN_ALL_OB_FT:[id number]==],[NS_DeliveryAssist1_OBGYN_ALL_OB_FT:[id number]=],[NS_DeliveryRN_OBGYN_ALL_OB_FT:[id number]=]

## 2025-03-21 NOTE — OB PROVIDER LABOR PROGRESS NOTE - ASSESSMENT
Resident arrived in patient room due to 4 minutes of inability to trace baby. Patient unmedicated, in a lot of pain, and standing. Examined due to rectal pressure and found to be 8cm. Attempted repositioning in left lateral, right lateral, and then all fours position. There was still no ability to adequately trace baby and intermittently FH could be heard in the 60-80s. FSE placed to more adequately monitor FH. FH confirmed to be 60s-80s. At time of FSE placement, patient fully dilated and attempted pushing with good fetal movement. At this point FH was down or unable to be traced for a total of 19 minutes. OB/ STAT called. Patient brought to operating room where FH was still 60s-80s. Attempted again to push but fetal head still too high to vacuum.  Decision made to proceed with STAT  section. See delivery summary for details of  section. 
- cook balloon in situ  - titrate pitocin as tolerated  - will continue to monitor

## 2025-03-21 NOTE — OB RN PATIENT PROFILE - FALL HARM RISK - HARM RISK INTERVENTIONS

## 2025-03-21 NOTE — OB PROVIDER H&P - NSLOWPPHRISK_OBGYN_A_OB
No previous uterine incision/Paige Pregnancy/Less than or equal to 4 previous vaginal births/No known bleeding disorder/No history of postpartum hemorrhage/No other PPH risks indicated

## 2025-03-21 NOTE — OB PROVIDER DELIVERY SUMMARY - NSPROVIDERDELIVERYNOTE_OBGYN_ALL_OB_FT
Patient 35yo  at 41+1 presented for IOL secondary to post-dates. Patient made rapid cervical change and OB STAT called for terminal bradycardia. In OR, proceeded with stat c/section as FHR noted to be in 60-70s. Patient underwent general anesthesia. Stat low transverse  section performed. Delivery of live infant in cephalic presentation without difficulty. Right uterine extension noted. Uterus closed in two layers with 0 Vicryl suture. Excellent hemostasis. Fascia closed with 1-0 Vicryl suture. Skin closed with ensorb. EBL 800cc, IVF 1500cc, UOP 50cc. Patient tolerated procedure well.

## 2025-03-21 NOTE — PRE-ANESTHESIA EVALUATION ADULT - NSANTHOSAYNRD_GEN_A_CORE
No. SHAKIR screening performed.  STOP BANG Legend: 0-2 = LOW Risk; 3-4 = INTERMEDIATE Risk; 5-8 = HIGH Risk

## 2025-03-21 NOTE — OB RN DELIVERY SUMMARY - NSSELHIDDEN_OBGYN_ALL_OB_FT
[NS_DeliveryAttending1_OBGYN_ALL_OB_FT:[id number]==],[NS_DeliveryAssist1_OBGYN_ALL_OB_FT:[id number]],[NS_DeliveryRN_OBGYN_ALL_OB_FT:[id number]=]

## 2025-03-21 NOTE — PRE-ANESTHESIA EVALUATION ADULT - NSPROPOSEDPROCEDFT_GEN_ALL_CORE
OBSTETRIC HISTORY:   OB History    No obstetric history on file.          COMPREHENSIVE GYN HISTORY:  PAP History: Denies abnormal Paps.  Infection History: Denies STDs. Denies PID.  Benign History: Denies uterine fibroids. Denies ovarian cysts. Denies endometriosis.   Cancer History: Denies cervical cancer. Denies uterine cancer or hyperplasia. Denies ovarian cancer. Denies vulvar cancer or pre-cancer. Denies vaginal cancer or pre-cancer. Denies breast cancer. Denies colon cancer.  Sexual Activity History:   reports never being sexually active.   Menstrual History: Age of menarche: 10 years. Every 3 months (previously 1 year but has been working out 5 days a week--discussed might weigh the same but leaner which decreases fat which decreases peripheral conversion of estrogen). Heavy flow.  Dysmenorrhea History: Reports moderate dysmenorrhea.  Contraception: None not active    HPI:   19 y.o.  Patient's last menstrual period was 06/05/2024.   Patient is  here for her annual gynecologic exam and discuss PCOS. Feels like she is not getting answers from "[employer]. Took Provera in the past and OCP but vomited both pills. Only one family member with diabetes and no obesity to her knowledge (sister had issue but had eating disorder). She denies bladder, breast and bowel complaints.    ROS:  GENERAL: No weight gain or weight loss.   CHEST: No shortness of breath.   ABDOMEN: No abdominal pain, constipation, diarrhea, nausea, vomiting or rectal bleeding.   URINARY: No frequency, dysuria, hematuria, or burning on urination.  REPRODUCTIVE: See HPI.   BREASTS: No breast pain, lumps, or nipple discharge.   PSYCHIATRIC: No depression or anxiety.    PE:   /71   Wt 77.6 kg (170 lb 15.5 oz)   LMP 06/05/2024   BMI 28.90 kg/m²   APPEARANCE: Well nourished, well developed, in no acute distress.  NECK: Neck symmetric without  thyromegaly.  NODES: No inguinal, cervical lymph node enlargement.  CHEST: Lungs clear to  auscultation.  HEART: Regular rate and rhythm, no murmurs, rubs or gallops.  ABDOMEN: Soft. No tenderness or masses. No hernias.  BREASTS: Symmetrical, no skin changes or visible lesions. No palpable masses, nipple discharge or adenopathy bilaterally.  PELVIC: DEFER    PROCEDURES:  Pap smear--DEFER AGE 21      Assessment:  Normal Gynecologic Exam  Oligomenorrhea--PCOS--labs today fasting (had one sip of water)--will try Prometrium to see if tolerates--discussed importance of periods and shedding lining    Plan:  Mammogram and Colonoscopy if indicated by current recommendations.  Return to clinic in one year or for any problems or complaints.    Counseling:  Patient was counseled today on A.C.S. Pap guidelines and recommendations for yearly pelvic exams and monthly self breast exams; to see her PCP for other health maintenance.     As of April 1, 2021, the Cures Act has been passed nationally. This new law requires that all doctors progress notes, lab results, pathology reports and radiology reports be released IMMEDIATELY to the patient in the patient portal. That means that the results are released to you at the EXACT same time they are released to me. Therefore, with all of the patients that I have I am not able to reply to each patient exactly when the results come in. So there will be a delay from when you see the results to when I see them and have time to come up with a response to send you. Also I only see these results when I am on the computer at work. So if the results come in over the weekend or after 5 pm of a work day, I will not see them until the next business day. As you can tell, this is a challenge as a physician to give every patient the quick response they hope for and deserve. So please be patient!     Thanks for understanding,                    Emergent  section

## 2025-03-21 NOTE — OB NEONATOLOGY/PEDIATRICIAN DELIVERY SUMMARY - NSPEDSNEONOTESA_OBGYN_ALL_OB_FT
Rapid response called for fetal bradycardia on 41 week infant. Meconium noted at time of delivery. Infant emerged with tone and activity. Brought to radiant warmer and routine resuscitation performed. Will follow up cord gases.

## 2025-03-21 NOTE — OB RN PATIENT PROFILE - SURGICAL SITE INCISION
Her hip gives out at times, she is thinking she should possibly go for an xray? Pls return her call, thanks.    no

## 2025-03-21 NOTE — OB RN DELIVERY SUMMARY - BABY A: APGAR 5 MIN MUSCLE TONE, DELIVERY
Called @ 8:06 AM 02/13/23 and [facility] for patient to help with scheduling.  LAZARUS Voss MA       (2) well flexed

## 2025-03-22 LAB
BASOPHILS # BLD AUTO: 0.03 K/UL — SIGNIFICANT CHANGE UP (ref 0–0.2)
BASOPHILS NFR BLD AUTO: 0.2 % — SIGNIFICANT CHANGE UP (ref 0–2)
EOSINOPHIL # BLD AUTO: 0.01 K/UL — SIGNIFICANT CHANGE UP (ref 0–0.5)
EOSINOPHIL NFR BLD AUTO: 0.1 % — SIGNIFICANT CHANGE UP (ref 0–6)
HCT VFR BLD CALC: 32.2 % — LOW (ref 34.5–45)
HGB BLD-MCNC: 11.2 G/DL — LOW (ref 11.5–15.5)
IMM GRANULOCYTES NFR BLD AUTO: 0.4 % — SIGNIFICANT CHANGE UP (ref 0–0.9)
LYMPHOCYTES # BLD AUTO: 15.6 % — SIGNIFICANT CHANGE UP (ref 13–44)
LYMPHOCYTES # BLD AUTO: 2.07 K/UL — SIGNIFICANT CHANGE UP (ref 1–3.3)
MCHC RBC-ENTMCNC: 31.2 PG — SIGNIFICANT CHANGE UP (ref 27–34)
MCHC RBC-ENTMCNC: 34.8 G/DL — SIGNIFICANT CHANGE UP (ref 32–36)
MCV RBC AUTO: 89.7 FL — SIGNIFICANT CHANGE UP (ref 80–100)
MONOCYTES # BLD AUTO: 0.93 K/UL — HIGH (ref 0–0.9)
MONOCYTES NFR BLD AUTO: 7 % — SIGNIFICANT CHANGE UP (ref 2–14)
NEUTROPHILS # BLD AUTO: 10.17 K/UL — HIGH (ref 1.8–7.4)
NEUTROPHILS NFR BLD AUTO: 76.7 % — SIGNIFICANT CHANGE UP (ref 43–77)
NRBC BLD AUTO-RTO: 0 /100 WBCS — SIGNIFICANT CHANGE UP (ref 0–0)
PLATELET # BLD AUTO: 190 K/UL — SIGNIFICANT CHANGE UP (ref 150–400)
RBC # BLD: 3.59 M/UL — LOW (ref 3.8–5.2)
RBC # FLD: 12.9 % — SIGNIFICANT CHANGE UP (ref 10.3–14.5)
WBC # BLD: 13.26 K/UL — HIGH (ref 3.8–10.5)
WBC # FLD AUTO: 13.26 K/UL — HIGH (ref 3.8–10.5)

## 2025-03-22 RX ORDER — IBUPROFEN 200 MG
600 TABLET ORAL EVERY 6 HOURS
Refills: 0 | Status: DISCONTINUED | OUTPATIENT
Start: 2025-03-22 | End: 2025-03-24

## 2025-03-22 RX ADMIN — Medication 975 MILLIGRAM(S): at 15:22

## 2025-03-22 RX ADMIN — Medication 100 MILLIGRAM(S): at 12:54

## 2025-03-22 RX ADMIN — Medication 600 MILLIGRAM(S): at 18:46

## 2025-03-22 RX ADMIN — KETOROLAC TROMETHAMINE 30 MILLIGRAM(S): 30 INJECTION, SOLUTION INTRAMUSCULAR; INTRAVENOUS at 12:54

## 2025-03-22 RX ADMIN — KETOROLAC TROMETHAMINE 30 MILLIGRAM(S): 30 INJECTION, SOLUTION INTRAMUSCULAR; INTRAVENOUS at 05:25

## 2025-03-22 RX ADMIN — Medication 975 MILLIGRAM(S): at 08:47

## 2025-03-22 RX ADMIN — ENOXAPARIN SODIUM 40 MILLIGRAM(S): 100 INJECTION SUBCUTANEOUS at 08:48

## 2025-03-22 RX ADMIN — Medication 100 MILLIGRAM(S): at 04:01

## 2025-03-22 NOTE — LACTATION INITIAL EVALUATION - LACTATION INTERVENTIONS
baby has been a reluctant feeder, reported to be very mucousy. s/p stat c/s with general anesthesia. SGA. stable chems. offered formula, baby reluctant to suck. promoted s2s and HE with finger feeding. after an hr or so baby able to latch to L side in cross cradle hold with full assist. observed approx 5 min feed. mom advised to continue to maximize s2s and HE/finger feed 15-20min per breast q3hr. colostrum is easily expressible./initiate/review safe skin-to-skin/initiate/review hand expression/initiate/review techniques for position and latch/reviewed components of an effective feeding and at least 8 effective feedings per day required/reviewed importance of monitoring infant diapers, the breastfeeding log, and minimum output each day/reviewed benefits and recommendations for rooming in/reviewed feeding on demand/by cue at least 8 times a day/recommended follow-up with pediatrician within 24 hours of discharge/reviewed indications of inadequate milk transfer that would require supplementation

## 2025-03-23 RX ADMIN — Medication 600 MILLIGRAM(S): at 06:04

## 2025-03-23 RX ADMIN — ENOXAPARIN SODIUM 40 MILLIGRAM(S): 100 INJECTION SUBCUTANEOUS at 08:53

## 2025-03-23 RX ADMIN — Medication 975 MILLIGRAM(S): at 08:54

## 2025-03-23 RX ADMIN — Medication 600 MILLIGRAM(S): at 23:54

## 2025-03-23 RX ADMIN — Medication 600 MILLIGRAM(S): at 12:03

## 2025-03-23 RX ADMIN — Medication 1 APPLICATION(S): at 08:53

## 2025-03-23 RX ADMIN — Medication 975 MILLIGRAM(S): at 21:07

## 2025-03-23 RX ADMIN — Medication 975 MILLIGRAM(S): at 14:41

## 2025-03-23 RX ADMIN — Medication 600 MILLIGRAM(S): at 18:09

## 2025-03-24 ENCOUNTER — TRANSCRIPTION ENCOUNTER (OUTPATIENT)
Age: 35
End: 2025-03-24

## 2025-03-24 VITALS
TEMPERATURE: 98 F | DIASTOLIC BLOOD PRESSURE: 74 MMHG | HEART RATE: 61 BPM | OXYGEN SATURATION: 98 % | RESPIRATION RATE: 16 BRPM | SYSTOLIC BLOOD PRESSURE: 111 MMHG

## 2025-03-24 PROCEDURE — 88307 TISSUE EXAM BY PATHOLOGIST: CPT

## 2025-03-24 PROCEDURE — 59050 FETAL MONITOR W/REPORT: CPT

## 2025-03-24 PROCEDURE — 86901 BLOOD TYPING SEROLOGIC RH(D): CPT

## 2025-03-24 PROCEDURE — 36415 COLL VENOUS BLD VENIPUNCTURE: CPT

## 2025-03-24 PROCEDURE — 86850 RBC ANTIBODY SCREEN: CPT

## 2025-03-24 PROCEDURE — 74018 RADEX ABDOMEN 1 VIEW: CPT

## 2025-03-24 PROCEDURE — 82803 BLOOD GASES ANY COMBINATION: CPT

## 2025-03-24 PROCEDURE — 85025 COMPLETE CBC W/AUTO DIFF WBC: CPT

## 2025-03-24 PROCEDURE — 86780 TREPONEMA PALLIDUM: CPT

## 2025-03-24 PROCEDURE — 86900 BLOOD TYPING SEROLOGIC ABO: CPT

## 2025-03-24 RX ORDER — IBUPROFEN 200 MG
1 TABLET ORAL
Qty: 0 | Refills: 0 | DISCHARGE
Start: 2025-03-24

## 2025-03-24 RX ORDER — ACETAMINOPHEN 500 MG/5ML
3 LIQUID (ML) ORAL
Qty: 0 | Refills: 0 | DISCHARGE
Start: 2025-03-24

## 2025-03-24 RX ADMIN — Medication 975 MILLIGRAM(S): at 14:32

## 2025-03-24 RX ADMIN — Medication 600 MILLIGRAM(S): at 06:34

## 2025-03-24 RX ADMIN — Medication 975 MILLIGRAM(S): at 09:28

## 2025-03-24 RX ADMIN — Medication 600 MILLIGRAM(S): at 11:48

## 2025-03-24 NOTE — DISCHARGE NOTE OB - PATIENT PORTAL LINK FT
You can access the FollowMyHealth Patient Portal offered by [facility] by registering at the following website: [email or web address]. By joining [employer]’s FollowMyHealth portal, you will also be able to view your health information using other applications (apps) compatible with our system.

## 2025-03-24 NOTE — PROGRESS NOTE ADULT - ASSESSMENT
34y Female POD#1  s/p stat C/S for terminal bradycardia under general anesthesia, Uncomplicated                                       - Neuro/Pain:  toradol atc, tylenol atc, oxy prn  - CV:  VS per routine, AM CBC pending  - Pulm: Encourage ISS & Ambulation  - GI: Advance as tolerated  - : Vance in place, to be removed this morning, TOV this afternoon  - DVT ppx: SCDs, Lovenox 40mg QD  - Dispo: POD #2/3
34y Female POD#2  s/p stat C/S for terminal bradycardia under general anesthesia, Uncomplicated                                       - Neuro/Pain:  toradol atc, tylenol atc, oxy prn  - CV:  VS per routine, POD1 Hgb 11.2 (preop 13.3); asymptomatic; continue to monitor  - Pulm: Encourage ISS & Ambulation  - GI: Advance as tolerated  - : voiding  - DVT ppx: SCDs, Lovenox 40mg QD  - Dispo: POD #2/3
A/P: 34y s/p STAT primary  section for terminal bradycardia at fully dilated, POD#3, stable  -  Pain: PO motrin q6hrs, tylenol q8hrs, oxycodone for severe pain PRN  -  GI: tolerating regular diet, passing gas  -  : s/p crouch , urinating without difficulty  - Heme: POD1 Hgb 11.2 from 13.3.   -  DVT prophylaxis: encouraged increased ambulation, SCDs, SQL  -  Dispo: POD 3 or 4

## 2025-03-24 NOTE — DISCHARGE NOTE OB - FINANCIAL ASSISTANCE
[facility] provides services at a reduced cost to those who are determined to be eligible through [facility]’s financial assistance program. Information regarding [facility]’s financial assistance program can be found by going to [email or web address] or by calling [phone].

## 2025-03-24 NOTE — DISCHARGE NOTE OB - CARE PROVIDER_API CALL
Fátima Lucas.  Obstetrics and Gynecology  [address] [address]  Phone: [phone]  Fax: [phone]  Follow Up Time: 2 weeks

## 2025-03-24 NOTE — DISCHARGE NOTE OB - HOSPITAL COURSE
Admitted for induction of labor.  Complicated by NRFHT, delivered via primary  section.  Uncomplicated surgery and postoperative course.  Acute blood loss anemia noted on post-operative CBC.  Patient stable with normal vital signs.  No intervention necessary.

## 2025-03-24 NOTE — DISCHARGE NOTE OB - NS MD DC FALL RISK RISK
For information on Fall & Injury Prevention, visit: [email or web address] OR  [email or web address] OR  https://www.cdc.gov/steadi/patient.html

## 2025-03-24 NOTE — PROGRESS NOTE ADULT - SUBJECTIVE AND OBJECTIVE BOX
Patient evaluated at bedside this morning, resting comfortable in bed.   She reports pain is well controlled with oral pain medications.   She denies nausea, vomiting or heavy vaginal bleeding.  She has been ambulating without assistance, voiding spontaneously, passing gas, tolerating regular diet. She has not yet had a bowel movement.     Physical Exam:  Vital Signs Last 24 Hrs  T(C): 36.6 (24 Mar 2025 06:00), Max: 36.6 (24 Mar 2025 06:00)  T(F): 97.8 (24 Mar 2025 06:00), Max: 97.8 (24 Mar 2025 06:00)  HR: 61 (24 Mar 2025 06:00) (61 - 73)  BP: 111/74 (24 Mar 2025 06:00) (111/74 - 118/77)  RR: 16 (24 Mar 2025 06:00) (16 - 17)  SpO2: 98% (24 Mar 2025 06:00) (97% - 98%)    Parameters below as of 24 Mar 2025 06:00  Patient On (Oxygen Delivery Method): room air        GA: NAD, A+0 x 3  Pulm: no increased WOB  Abd: soft, nontender, nondistended, no rebound or guarding, incision clean, dry and intact, uterus firm at midline  Extremities: no calf tenderness                   
Patient evaluated at bedside this morning, resting comfortable in bed, with no acute events overnight.  She reports pain is well controlled with oral pain medications.   She denies headache, dizziness, chest pain, palpitations, shortness of breath, nausea, vomiting or heavy vaginal bleeding.  She has not tried ambulating since procedure, crouch remains in place at this time. Tolerating clear liquids and passing gas.     Physical Exam:  Vital Signs Last 24 Hrs  T(C): 36.8 (22 Mar 2025 05:27), Max: 36.9 (21 Mar 2025 21:46)  T(F): 98.3 (22 Mar 2025 05:27), Max: 98.4 (21 Mar 2025 21:46)  HR: 61 (22 Mar 2025 05:27) (50 - 65)  BP: 113/74 (22 Mar 2025 05:27) (105/66 - 143/60)  BP(mean): 86 (21 Mar 2025 14:30) (74 - 95)  RR: 17 (22 Mar 2025 05:27) (14 - 18)  SpO2: 97% (22 Mar 2025 05:27) (96% - 100%)    Parameters below as of 21 Mar 2025 21:46  Patient On (Oxygen Delivery Method): room air        GA: NAD, A+0 x 3  Pulm: no increased WOB  Abd: soft, nontender, nondistended, no rebound or guarding, incision clean, dry and intact, uterus firm at midline, 2 fb below umbilicus  : crouch in situ, lochia WNL  Extremities: no swelling or calf tenderness, SCDs in place                            13.3   11.29 )-----------( 252      ( 21 Mar 2025 02:55 )             38.5               acetaminophen     Tablet .. 975 milliGRAM(s) Oral <User Schedule>  ceFAZolin   IVPB 2000 milliGRAM(s) IV Intermittent every 8 hours  diphenhydrAMINE 25 milliGRAM(s) Oral every 6 hours PRN  diphtheria/tetanus/pertussis (acellular) Vaccine (Adacel) 0.5 milliLiter(s) IntraMuscular once  enoxaparin Injectable 40 milliGRAM(s) SubCutaneous every 24 hours  HYDROmorphone PCA (1 mG/mL) 30 milliLiter(s) PCA Continuous PCA Continuous  ibuprofen  Tablet. 600 milliGRAM(s) Oral every 6 hours  ketorolac   Injectable 30 milliGRAM(s) IV Push every 6 hours  lactated ringers. 1000 milliLiter(s) IV Continuous <Continuous>  lanolin Ointment 1 Application(s) Topical every 6 hours PRN  magnesium hydroxide Suspension 30 milliLiter(s) Oral two times a day PRN  naloxone Injectable 0.1 milliGRAM(s) IV Push every 3 minutes PRN  ondansetron Injectable 4 milliGRAM(s) IV Push every 6 hours PRN  oxyCODONE    IR 5 milliGRAM(s) Oral every 3 hours PRN  oxyCODONE    IR 5 milliGRAM(s) Oral once PRN  oxytocin Infusion 42 milliUNIT(s)/Min IV Continuous <Continuous>  oxytocin Infusion 167 milliUNIT(s)/Min IV Continuous <Continuous>  simethicone 80 milliGRAM(s) Chew every 4 hours PRN  
Patient evaluated at bedside this morning, resting comfortable in bed, no acute events overnight. She reports pain is well-controlled.  She denies headache, dizziness, changes in vision, chest pain, palpitations, shortness of breath, RUQ pain, nausea, vomiting, fever, chills, heavy vaginal bleeding.  She has been ambulating without assistance, voiding spontaneously, tolerating food, passing flatus and BM.      Physical Exam:  T(C): 36.8 (03-23-25 @ 05:57), Max: 36.8 (03-23-25 @ 05:57)  HR: 60 (03-23-25 @ 05:57) (60 - 72)  BP: 105/71 (03-23-25 @ 05:57) (105/71 - 117/75)  RR: 17 (03-23-25 @ 05:57) (17 - 17)  SpO2: 96% (03-23-25 @ 05:57) (96% - 96%)    Gen: no apparent distress  Pulm: no increased work of breathing  Abd: soft, nontender, nondistended, no rebound or guarding, uterus firm; incision clean dry intact  Extremities: trace pedal edema bilaterally, no calf tenderness bilaterally                          11.2   13.26 )-----------( 190      ( 22 Mar 2025 05:30 )             32.2           acetaminophen     Tablet .. 975 milliGRAM(s) Oral <User Schedule>  diphenhydrAMINE 25 milliGRAM(s) Oral every 6 hours PRN  diphtheria/tetanus/pertussis (acellular) Vaccine (Adacel) 0.5 milliLiter(s) IntraMuscular once  enoxaparin Injectable 40 milliGRAM(s) SubCutaneous every 24 hours  ibuprofen  Tablet. 600 milliGRAM(s) Oral every 6 hours  lactated ringers. 1000 milliLiter(s) IV Continuous <Continuous>  lanolin Ointment 1 Application(s) Topical every 6 hours PRN  magnesium hydroxide Suspension 30 milliLiter(s) Oral two times a day PRN  naloxone Injectable 0.1 milliGRAM(s) IV Push every 3 minutes PRN  ondansetron Injectable 4 milliGRAM(s) IV Push every 6 hours PRN  oxyCODONE    IR 5 milliGRAM(s) Oral every 3 hours PRN  oxyCODONE    IR 5 milliGRAM(s) Oral once PRN  oxytocin Infusion 42 milliUNIT(s)/Min IV Continuous <Continuous>  oxytocin Infusion 167 milliUNIT(s)/Min IV Continuous <Continuous>  simethicone 80 milliGRAM(s) Chew every 4 hours PRN

## 2025-03-28 DIAGNOSIS — O61.1 FAILED INSTRUMENTAL INDUCTION OF LABOR: ICD-10-CM

## 2025-04-08 ENCOUNTER — APPOINTMENT (OUTPATIENT)
Dept: OBGYN | Facility: CLINIC | Age: 35
End: 2025-04-08
Payer: COMMERCIAL

## 2025-04-08 VITALS
BODY MASS INDEX: 24.25 KG/M2 | OXYGEN SATURATION: 96 % | DIASTOLIC BLOOD PRESSURE: 70 MMHG | HEART RATE: 77 BPM | SYSTOLIC BLOOD PRESSURE: 110 MMHG | WEIGHT: 132.61 LBS

## 2025-04-08 DIAGNOSIS — Z48.89 ENCOUNTER FOR OTHER SPECIFIED SURGICAL AFTERCARE: ICD-10-CM

## 2025-04-10 PROBLEM — Z48.89 AFTERCARE FOLLOWING SURGERY: Status: ACTIVE | Noted: 2025-04-10

## 2025-05-06 ENCOUNTER — APPOINTMENT (OUTPATIENT)
Dept: OBGYN | Facility: CLINIC | Age: 35
End: 2025-05-06

## 2025-05-06 VITALS
WEIGHT: 131 LBS | SYSTOLIC BLOOD PRESSURE: 100 MMHG | BODY MASS INDEX: 23.96 KG/M2 | DIASTOLIC BLOOD PRESSURE: 70 MMHG | HEART RATE: 68 BPM | OXYGEN SATURATION: 98 %

## 2025-05-06 PROCEDURE — 99213 OFFICE O/P EST LOW 20 MIN: CPT

## 2025-05-08 ENCOUNTER — NON-APPOINTMENT (OUTPATIENT)
Age: 35
End: 2025-05-08

## 2025-09-16 ENCOUNTER — APPOINTMENT (OUTPATIENT)
Dept: OBGYN | Facility: CLINIC | Age: 35
End: 2025-09-16
Payer: COMMERCIAL

## 2025-09-16 VITALS
DIASTOLIC BLOOD PRESSURE: 60 MMHG | OXYGEN SATURATION: 99 % | BODY MASS INDEX: 22.5 KG/M2 | WEIGHT: 123 LBS | SYSTOLIC BLOOD PRESSURE: 100 MMHG | HEART RATE: 67 BPM

## 2025-09-16 PROCEDURE — 99395 PREV VISIT EST AGE 18-39: CPT

## 2025-09-16 PROCEDURE — 99459 PELVIC EXAMINATION: CPT
